# Patient Record
Sex: FEMALE | Race: WHITE | NOT HISPANIC OR LATINO | URBAN - METROPOLITAN AREA
[De-identification: names, ages, dates, MRNs, and addresses within clinical notes are randomized per-mention and may not be internally consistent; named-entity substitution may affect disease eponyms.]

---

## 2019-02-12 ENCOUNTER — OUTPATIENT (OUTPATIENT)
Dept: OUTPATIENT SERVICES | Facility: HOSPITAL | Age: 69
LOS: 1 days | End: 2019-02-12
Payer: COMMERCIAL

## 2019-02-12 VITALS
HEART RATE: 65 BPM | OXYGEN SATURATION: 100 % | DIASTOLIC BLOOD PRESSURE: 83 MMHG | SYSTOLIC BLOOD PRESSURE: 120 MMHG | RESPIRATION RATE: 18 BRPM

## 2019-02-12 VITALS
OXYGEN SATURATION: 100 % | HEART RATE: 64 BPM | HEIGHT: 62 IN | RESPIRATION RATE: 15 BRPM | TEMPERATURE: 98 F | WEIGHT: 108.47 LBS | DIASTOLIC BLOOD PRESSURE: 55 MMHG | SYSTOLIC BLOOD PRESSURE: 127 MMHG

## 2019-02-12 DIAGNOSIS — H25.22 AGE-RELATED CATARACT, MORGAGNIAN TYPE, LEFT EYE: ICD-10-CM

## 2019-02-12 DIAGNOSIS — Z98.41 CATARACT EXTRACTION STATUS, RIGHT EYE: Chronic | ICD-10-CM

## 2019-02-12 PROCEDURE — 66984 XCAPSL CTRC RMVL W/O ECP: CPT | Mod: LT

## 2019-02-12 PROCEDURE — V2632: CPT

## 2019-02-12 NOTE — ASU DISCHARGE PLAN (ADULT/PEDIATRIC). - SPECIAL INSTRUCTIONS
Follow Dr. Cochran's Post Operative Instruction sheet  Do not rub the operative eye   Resume regular medications as per your physician's instructions  May take Tylenol (Acetaminophen ) as needed for pain as directed  Bring all eye drops to the doctor's office for your follow-up visit